# Patient Record
Sex: FEMALE | Race: OTHER | NOT HISPANIC OR LATINO | URBAN - METROPOLITAN AREA
[De-identification: names, ages, dates, MRNs, and addresses within clinical notes are randomized per-mention and may not be internally consistent; named-entity substitution may affect disease eponyms.]

---

## 2018-08-20 ENCOUNTER — EMERGENCY (EMERGENCY)
Facility: HOSPITAL | Age: 28
LOS: 1 days | Discharge: HOME | End: 2018-08-20
Admitting: EMERGENCY MEDICINE
Payer: COMMERCIAL

## 2018-08-20 VITALS
HEART RATE: 96 BPM | SYSTOLIC BLOOD PRESSURE: 142 MMHG | OXYGEN SATURATION: 98 % | TEMPERATURE: 98 F | DIASTOLIC BLOOD PRESSURE: 88 MMHG | RESPIRATION RATE: 16 BRPM

## 2018-08-20 PROCEDURE — 99283 EMERGENCY DEPT VISIT LOW MDM: CPT

## 2018-08-20 RX ORDER — NEOMYCIN/POLYMYXIN B/HYDROCORT
2 SUSPENSION, DROPS(FINAL DOSAGE FORM)(ML) OTIC (EAR)
Qty: 5 | Refills: 0 | OUTPATIENT
Start: 2018-08-20 | End: 2018-08-26

## 2018-08-20 NOTE — ED PROVIDER NOTE - ENMT, MLM
right ear +tragus tenderness, +mild canal edema and discharge, TM intact with no erythema or bulging

## 2018-08-20 NOTE — ED PROVIDER NOTE - OBJECTIVE STATEMENT
27 y/o f hx recurrent ear infections presents c/o right ear pain for one day.  Pt stating she took ibuprofen with moderate improvement, although having recurrence of pain.  Denies fever, chills, all other ROS negative.

## 2018-08-20 NOTE — ED ADULT NURSE NOTE - NSIMPLEMENTINTERV_GEN_ALL_ED
Implemented All Universal Safety Interventions:  [place] to call system. Call bell, personal items and telephone within reach. Instruct patient to call for assistance. Room bathroom lighting operational. Non-slip footwear when patient is off stretcher. Physically safe environment: no spills, clutter or unnecessary equipment. Stretcher in lowest position, wheels locked, appropriate side rails in place.

## 2018-08-20 NOTE — ED PROVIDER NOTE - MEDICAL DECISION MAKING DETAILS
27 y/o f hx recurrent ear infections presents with right ear pain for 1 day; exam consistent with otitis externa, will treat with cortisporin otic drops

## 2018-08-24 DIAGNOSIS — H60.01 ABSCESS OF RIGHT EXTERNAL EAR: ICD-10-CM

## 2018-08-24 DIAGNOSIS — H92.01 OTALGIA, RIGHT EAR: ICD-10-CM

## 2018-08-24 DIAGNOSIS — Z79.899 OTHER LONG TERM (CURRENT) DRUG THERAPY: ICD-10-CM
